# Patient Record
Sex: FEMALE | Race: WHITE | Employment: FULL TIME | URBAN - METROPOLITAN AREA
[De-identification: names, ages, dates, MRNs, and addresses within clinical notes are randomized per-mention and may not be internally consistent; named-entity substitution may affect disease eponyms.]

---
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
## 2020-07-09 ENCOUNTER — HOSPITAL ENCOUNTER (OUTPATIENT)
Dept: ULTRASOUND IMAGING | Age: 20
Discharge: HOME | End: 2020-07-11
Payer: COMMERCIAL

## 2020-07-09 PROCEDURE — 76641 ULTRASOUND BREAST COMPLETE: CPT

## 2021-10-13 ENCOUNTER — HOSPITAL ENCOUNTER (EMERGENCY)
Age: 21
Discharge: HOME | End: 2021-10-13
Attending: EMERGENCY MEDICINE
Payer: COMMERCIAL

## 2021-10-13 VITALS
HEART RATE: 93 BPM | HEIGHT: 66 IN | BODY MASS INDEX: 19.32 KG/M2 | TEMPERATURE: 98.2 F | OXYGEN SATURATION: 97 % | DIASTOLIC BLOOD PRESSURE: 76 MMHG | RESPIRATION RATE: 16 BRPM | SYSTOLIC BLOOD PRESSURE: 116 MMHG | WEIGHT: 120.19 LBS

## 2021-10-13 DIAGNOSIS — M79.10 MYALGIA: ICD-10-CM

## 2021-10-13 DIAGNOSIS — J06.9 ACUTE UPPER RESPIRATORY INFECTION: Primary | ICD-10-CM

## 2021-10-13 DIAGNOSIS — Z20.822 ENCOUNTER FOR LABORATORY TESTING FOR COVID-19 VIRUS: ICD-10-CM

## 2021-10-13 LAB — S PYO AG THROAT QL: NEGATIVE

## 2021-10-13 PROCEDURE — 99283 EMERGENCY DEPT VISIT LOW MDM: CPT

## 2021-10-13 PROCEDURE — 87081 CULTURE SCREEN ONLY: CPT

## 2021-10-13 PROCEDURE — 87880 STREP A ASSAY W/OPTIC: CPT

## 2021-10-13 PROCEDURE — U0003 INFECTIOUS AGENT DETECTION BY NUCLEIC ACID (DNA OR RNA); SEVERE ACUTE RESPIRATORY SYNDROME CORONAVIRUS 2 (SARS-COV-2) (CORONAVIRUS DISEASE [COVID-19]), AMPLIFIED PROBE TECHNIQUE, MAKING USE OF HIGH THROUGHPUT TECHNOLOGIES AS DESCRIBED BY CMS-2020-01-R: HCPCS

## 2021-10-13 PROCEDURE — U0005 INFEC AGEN DETEC AMPLI PROBE: HCPCS

## 2021-10-13 RX ORDER — BENZONATATE 100 MG/1
100 CAPSULE ORAL 3 TIMES DAILY PRN
Qty: 30 CAPSULE | Refills: 0 | Status: SHIPPED | OUTPATIENT
Start: 2021-10-13 | End: 2021-10-20

## 2021-10-13 RX ORDER — GUAIFENESIN 600 MG/1
600 TABLET, EXTENDED RELEASE ORAL 2 TIMES DAILY
Qty: 30 TABLET | Refills: 0 | Status: SHIPPED | OUTPATIENT
Start: 2021-10-13 | End: 2021-10-28

## 2021-10-13 ASSESSMENT — PAIN SCALES - GENERAL: PAINLEVEL_OUTOF10: 6

## 2021-10-13 ASSESSMENT — PAIN DESCRIPTION - LOCATION: LOCATION: THROAT

## 2021-10-13 ASSESSMENT — PAIN DESCRIPTION - DESCRIPTORS: DESCRIPTORS: ACHING

## 2021-10-13 ASSESSMENT — PAIN DESCRIPTION - PAIN TYPE: TYPE: ACUTE PAIN

## 2021-10-13 NOTE — ED PROVIDER NOTES
CHIEF COMPLAINT  Pharyngitis      HISTORY OF PRESENT ILLNESS  Vesta Primrose  is a 24 y.o. female who presents to the ED at via private vehicle complaining of sore throat. Patient states that for the last couple of days she has noted increasing sore throat, right greater than left. No difficulty swallowing. Patient has also noted upper respiratory congestion with nonproductive cough, mild headache, and body aches. Patient arrives in the emergency department afebrile with normal vital signs. She has had 1 of 2 Covid vaccination shots. .       There are no other complaints, modifying factors or associated symptoms. Nursing notes reviewed. Past medical history:  has no past medical history on file. Past surgical history:  has no past surgical history on file. Home medications:   Prior to Admission medications    Medication Sig Start Date End Date Taking? Authorizing Provider   guaiFENesin (MUCINEX) 600 MG extended release tablet Take 1 tablet by mouth 2 times daily for 15 days 10/13/21 10/28/21 Yes Ulis Fair, DO   benzonatate (TESSALON) 100 MG capsule Take 1 capsule by mouth 3 times daily as needed for Cough 10/13/21 10/20/21 Yes Ulis Fair, DO   Norethin Ace-Eth Estrad-FE 1-20 MG-MCG(24) TABS Take 1 tablet by mouth daily 1/31/18   ORLANDO Patel CNM       No Known Allergies    Social history:  reports that she has never smoked. She has never used smokeless tobacco. She reports current alcohol use. She reports that she does not use drugs.     Family history:    Family History   Problem Relation Age of Onset    High Cholesterol Mother     No Known Problems Father     No Known Problems Sister     No Known Problems Brother        REVIEW OF SYSTEMS  6 systems reviewed, pertinent positives per HPI otherwise noted to be negative    PHYSICAL EXAM  Vitals:    10/13/21 1225   BP: 116/76   Pulse: 93   Resp: 16   Temp: 98.2 °F (36.8 °C)   SpO2: 97%       GENERAL: Patient is well-developed, well-nourished,  no acute distress. Mild to moderate apparent discomfort. Non toxic appearing. HEENT:  Normocephalic, atraumatic. PERRL. Conjunctiva appear normal.  External ears are normal.  TMs within normal limits. Posterior oropharynx without erythema or exudate. MMM  NECK: Supple with normal ROM. Trachea midline  Negative Kernig's and Burzynski signs. LUNGS:  Normal work of breathing. Speaking comfortably in full sentences. EXTREMITIES: 2+ distal pulses w/o edema. MUSCULOSKELETAL:  Atraumatic extremities with normal ROM grossly. No obvious bony deformities. SKIN: Warm/dry. No rashes/lesions noted. PSYCHIATRIC: Patient is alert and oriented with normal affect  NEUROLOGIC: Cranial nerves grossly intact. Moves all extremities with equal strength. No gross sensory deficits. Answers questions/follows commands appropriately. ED COURSE/MDM  Nursing notes reviewed. Pt was given the following medications or treatments in the ED:     Strep negative. Covid pending. Return precautions and isolation recommendations provided. Clinical Impression  Based on the presenting complaint, history, and physical exam, multiple diagnoses were considered. Exam and workup here most c/w:  1. Acute upper respiratory infection    2. Myalgia    3. Encounter for laboratory testing for COVID-19 virus        I discussed with Aristides Mays the results of evaluation in the ED, diagnosis, care, and prognosis. The plan is to discharge to home. Patient is in agreement with plan and questions have been answered. I also discussed with Aristides Mays the reasons which may require a return visit and the importance of follow-up care. The patient is well-appearing, nontoxic, and improved at the time of discharge. Patient agrees to call to arrange follow-up care as directed. Aristides Mays understands to return immediately for worsening/change in symptoms.       Patient will be started on the following medications from the ED:  Discharge Medication List as of 10/13/2021  1:41 PM      START taking these medications    Details   guaiFENesin (MUCINEX) 600 MG extended release tablet Take 1 tablet by mouth 2 times daily for 15 days, Disp-30 tablet, R-0Normal      benzonatate (TESSALON) 100 MG capsule Take 1 capsule by mouth 3 times daily as needed for Cough, Disp-30 capsule, R-0Normal               Disposition  Pt is discharged in stable condition.     Disposition Vitals:  /76   Pulse 93   Temp 98.2 °F (36.8 °C) (Oral)   Resp 16   Ht 5' 6\" (1.676 m)   Wt 120 lb 3 oz (54.5 kg)   SpO2 97%   BMI 19.40 kg/m²                    Efrain Alonzo DO  10/13/21 1401

## 2021-10-13 NOTE — Clinical Note
Nedra Pepe was seen and treated in our emergency department on 10/13/2021. She may return to work on 10/14/2021. If you remain afebrile and have a negative Covid test.     If you have any questions or concerns, please don't hesitate to call.       Dora Fernandez, DO

## 2021-10-13 NOTE — Clinical Note
Eliz Senior was seen and treated in our emergency department on 10/13/2021. She may return to work on 10/14/2021. If you remain afebrile and have a negative Covid test.     If you have any questions or concerns, please don't hesitate to call.       Sade Liz, DO

## 2021-10-13 NOTE — ED NOTES
Patient to ed with complaints of a sore throat which started 2-3 days ago, patient reports she has a history of strep throat.      Hung Thomas RN  10/13/21 1009

## 2021-10-14 LAB — SARS-COV-2: NOT DETECTED

## 2021-10-15 LAB — S PYO THROAT QL CULT: NORMAL
